# Patient Record
Sex: FEMALE | Race: WHITE | NOT HISPANIC OR LATINO | Employment: FULL TIME | URBAN - METROPOLITAN AREA
[De-identification: names, ages, dates, MRNs, and addresses within clinical notes are randomized per-mention and may not be internally consistent; named-entity substitution may affect disease eponyms.]

---

## 2021-10-14 PROCEDURE — 41250 REPAIR TONGUE LACERATION: CPT

## 2021-10-14 PROCEDURE — 12011 RPR F/E/E/N/L/M 2.5 CM/<: CPT | Mod: 59

## 2021-10-14 PROCEDURE — 99283 EMERGENCY DEPT VISIT LOW MDM: CPT

## 2021-10-14 PROCEDURE — 272N000047 HC ADHESIVE DERMABOND SKIN

## 2021-10-14 ASSESSMENT — ENCOUNTER SYMPTOMS: WOUND: 1

## 2021-10-14 ASSESSMENT — MIFFLIN-ST. JEOR: SCORE: 1355.23

## 2021-10-15 ENCOUNTER — HOSPITAL ENCOUNTER (EMERGENCY)
Facility: HOSPITAL | Age: 31
Discharge: HOME | End: 2021-10-15
Attending: EMERGENCY MEDICINE | Admitting: EMERGENCY MEDICINE
Payer: COMMERCIAL

## 2021-10-15 VITALS
HEART RATE: 72 BPM | DIASTOLIC BLOOD PRESSURE: 61 MMHG | SYSTOLIC BLOOD PRESSURE: 137 MMHG | WEIGHT: 123 LBS | BODY MASS INDEX: 17.61 KG/M2 | RESPIRATION RATE: 16 BRPM | OXYGEN SATURATION: 99 % | HEIGHT: 70 IN | TEMPERATURE: 97.9 F

## 2021-10-15 DIAGNOSIS — S01.511A LIP LACERATION, INITIAL ENCOUNTER: ICD-10-CM

## 2021-10-15 DIAGNOSIS — S01.512A TONGUE LACERATION, INITIAL ENCOUNTER: ICD-10-CM

## 2021-10-15 NOTE — DISCHARGE INSTRUCTIONS
The skin glue as well as the sutures are absorbable so you do not need to have these removed. They should absorb into the body over the next 5 to 7 days.    Use antiseptic mouthwash at least twice daily at minimum but ideally after each time you eat. Do this until the wounds are closed.    Monitor for signs of infection which include increased swelling, pus draining from the wound or if you develop a fever greater than 100.4 degrees. Please follow-up for wound check with any concerns.

## 2021-10-15 NOTE — ED TRIAGE NOTES
Pt was playing softball tonight and was hit in the mouth by a pitched ball, did not lose consciousness

## 2021-10-15 NOTE — ED PROVIDER NOTES
EMERGENCY DEPARTMENT ENCOUNTER      NAME: Asha Gonzalez  AGE: 31 year old female  YOB: 1990  MRN: [id number]  EVALUATION DATE & TIME: No admission date for patient encounter.    PCP: No primary care provider on file.    ED PROVIDER: Tiffanie Leal M.D.      Chief Complaint   Patient presents with     Facial Pain     Trauma         FINAL IMPRESSION:  1. Tongue laceration, initial encounter    2. Lip laceration, initial encounter        MEDICAL DECISION MAKIN:36 PM I met with the patient, obtained history, performed an initial exam, and discussed options and plan for diagnostics and treatment here in the ED.   12:31 AM Performed laceration repair on patient.   12:35 AM I discussed the plan for discharge with the patient, and patient is agreeable. We discussed supportive cares at home and reasons for return to the ER including new or worsening symptoms - all questions and concerns addressed. Patient to be discharged by RN.    Pertinent Labs & Imaging studies reviewed. (See chart for details)     sAha Gonzalez is a 31 year oldfemale who presents with softball injury to her face.  Her facial bones, maxilla and mandible are stable without fracture.  She has no loose teeth.  She has a laceration in the inner left lower lip as well as of the tongue both superior and inferior aspect of the tongue.  We discussed ways of managing these lacerations and I do feel that the small laceration on the upper surface of the tongue can be glued but the inferior laceration as well as the laceration in the lower lip will need suture placement.      Her wounds were repaired and she tolerated this well.  We discussed indications for follow-up and daily wound care.  She understands these and all discharge instructions.      =================================================================    HPI    Patient information was obtained from: Patient     Use of : N/A      Asha Gonzalez is a 31 year old  "female with no history at this time who presents with facial trauma. Patient reports she was hit in the face with a softball, causing her to clench down and bite her tongue and lip at 2200 tonight. Patient notes having a laceration on her tongue and her lip. Patient notes her bottom teeth feel numb. Denies any abnormalities with her top teeth. Denies loss of consciousness. Patient states her tetanus is up to date. She has allergies to fentanyl. Patient denies any additional complaints at this time.       REVIEW OF SYSTEMS   Review of Systems   HENT: Positive for dental problem (numbness).    Skin: Positive for wound.   Neurological: Negative for syncope.        PAST MEDICAL HISTORY:  History reviewed. No pertinent past medical history.    PAST SURGICAL HISTORY:  History reviewed. No pertinent surgical history.    CURRENT MEDICATIONS:    No current facility-administered medications for this encounter.  No current outpatient medications on file.    ALLERGIES:  Allergies   Allergen Reactions     Fentanyl        FAMILY HISTORY:  History reviewed. No pertinent family history.    SOCIAL HISTORY:   Social History     Tobacco Use     Smoking status: None   Substance Use Topics     Alcohol use: None     Drug use: None        PHYSICAL EXAM:    Vitals: /61   Pulse 72   Temp 97.9  F (36.6  C)   Resp 16   Ht 1.765 m (5' 9.5\")   Wt 55.8 kg (123 lb)   LMP 10/02/2021   SpO2 99%   BMI 17.90 kg/m     General:. Alert and interactive, comfortable appearing.  HENT: Oropharynx without erythema or exudates. MMM.  TMs clear bilaterally. 1.5 cm cut to the left inner lower lip, bleeding controlled. Teeth, jaw, and facial bones stable. 1 cm cut left to mid tongue with slight oozing, no pulsatile bleeding. 0.75 cm laceration to left lower tongue.   Eyes: Pupils mid-sized and equally reactive.   Neck: Full AROM.  No midline tenderness to palpation.  Cardiovascular: Regular rate and rhythm. Peripheral pulses 2+ " bilaterally.  Chest/Pulmonary: Normal work of breathing. Lung sounds clear and equal throughout, no wheezes or crackles. No chest wall tenderness or deformities.  Abdomen: Soft, nondistended. Nontender without guarding or rebound.  Extremities: Normal ROM of all major joints. No lower extremity edema.   Skin: Warm and dry. Normal skin color.   Neuro: Speech clear. CNs grossly intact. Moves all extremities appropriately. Strength and sensation grossly intact to all extremities.   Psych: Normal affect/mood, cooperative, memory appropriate.         PROCEDURES:   PROCEDURE: Laceration Repair   INDICATIONS: Laceration   PROCEDURE PROVIDER: Tiffanie Leal    SITE: Tongue-upper   TYPE/SIZE: simple, clean and no foreign body visualized  1 cm (total length)   FUNCTIONAL ASSESSMENT: Distal sensation, circulation and motor intact   PREPARATION: irrigation with Sterile water   DEBRIDEMENT: no debridement   CLOSURE:  Wound was closed with Dermabond (medical glue)       PROCEDURE: Laceration Repair   INDICATIONS: Laceration   PROCEDURE PROVIDER: Tiffanie Leal    SITE: Tongue-inferior   TYPE/SIZE: simple, clean and no foreign body visualized  0.75 cm (total length)   FUNCTIONAL ASSESSMENT: Distal sensation, circulation and motor intact   MEDICATION: 3 mLs of 1% Lidocaine with epinephrine   PREPARATION: irrigation with Tap water   DEBRIDEMENT: no debridement   CLOSURE:  Wound was closed in   one layer: Skin closed with 1 stitches of 5-0 Fast Absorbing    Total number of sutures/staples placed: 1       PROCEDURE: Laceration Repair   INDICATIONS: Laceration   PROCEDURE PROVIDER: Tiffanie Leal   SITE: Left Lower Lip   TYPE/SIZE: simple, clean and no foreign body visualized  0.75 cm (total length)   FUNCTIONAL ASSESSMENT: Distal sensation, circulation and motor intact   MEDICATION: 2mLs of 1% Lidocaine with epinephrine   PREPARATION: irrigation with Tap water   DEBRIDEMENT: no debridement   CLOSURE:  Wound was closed in   one  layer: Skin closed with 3 stitches of 5-0 Fast Absorbing    Total number of sutures/staples placed: 3               I, David Luz, am serving as a scribe to document services personally performed by Dr. Tiffanie Leal  based on my observation and the provider's statements to me. I, Tiffanie Leal MD attest that David Luz is acting in a scribe capacity, has observed my performance of the services and has documented them in accordance with my direction.      Tiffanie Leal M.D.  Emergency Medicine  [facility] EMERGENCY DEPARTMENT  [facility] [facility] [address]  [phone]  Dept: [phone]         Tiffanie Leal MD  10/15/21 0241

## 2021-11-21 ENCOUNTER — HEALTH MAINTENANCE LETTER (OUTPATIENT)
Age: 31
End: 2021-11-21

## 2023-01-08 ENCOUNTER — HEALTH MAINTENANCE LETTER (OUTPATIENT)
Age: 33
End: 2023-01-08

## 2024-02-11 ENCOUNTER — HEALTH MAINTENANCE LETTER (OUTPATIENT)
Age: 34
End: 2024-02-11